# Patient Record
Sex: MALE | Race: WHITE | NOT HISPANIC OR LATINO | URBAN - METROPOLITAN AREA
[De-identification: names, ages, dates, MRNs, and addresses within clinical notes are randomized per-mention and may not be internally consistent; named-entity substitution may affect disease eponyms.]

---

## 2017-05-30 ENCOUNTER — IMPORTED ENCOUNTER (OUTPATIENT)
Dept: URBAN - METROPOLITAN AREA CLINIC 50 | Facility: CLINIC | Age: 67
End: 2017-05-30

## 2017-06-01 ENCOUNTER — IMPORTED ENCOUNTER (OUTPATIENT)
Dept: URBAN - METROPOLITAN AREA CLINIC 50 | Facility: CLINIC | Age: 67
End: 2017-06-01

## 2017-08-01 ENCOUNTER — IMPORTED ENCOUNTER (OUTPATIENT)
Dept: URBAN - METROPOLITAN AREA CLINIC 50 | Facility: CLINIC | Age: 67
End: 2017-08-01

## 2021-04-17 ASSESSMENT — TONOMETRY
OS_IOP_MMHG: 11
OS_IOP_MMHG: 11
OD_IOP_MMHG: 11
OD_IOP_MMHG: 11

## 2021-04-17 ASSESSMENT — VISUAL ACUITY
OS_CC: 20/20-1
OD_CC: 20/25+2
OS_CC: 20/25+
OS_CC: J1+
OD_CC: 20/25+2
OD_CC: J1+

## 2023-04-21 NOTE — PATIENT DISCUSSION
"""Follow dry ARMD without treatment. MVI/AREDS/Geraldine.  Patient "" DISPLAY PLAN FREE TEXT DISPLAY PLAN FREE TEXT

## 2024-07-05 NOTE — PATIENT DISCUSSION
"""Call if vision decreases or RD warning signs increases/RD warnings given.  No signs of retinal tear PAST MEDICAL HISTORY:  2019 novel coronavirus disease (COVID-19) 3/13/2020    Acute UTI 1/2022    Asthma last rescue inhaler use "yesterday"    Asthma     Atrial fibrillation on Eliquis    Carpal tunnel syndrome on both sides     Chronic GERD     Depression     Diabetes mellitus Type II, on metformin    Gastritis     GERD (Gastroesophageal Reflux Disease)     H/O pulmonary hypertension     H/O sleep apnea     H/O tachycardia-bradycardia syndrome     History of 2019 novel coronavirus disease (COVID-19) has prolonged dyspnea and cough improved on prednisone    Hyperlipidemia     Hypertension     Hypothyroidism was prescribed levothyroxine but not taking    Morbid Obesity     OA (osteoarthritis)     Right kidney mass     Right renal mass s/p biopsy 2yrs ago showing fibroma    Varicose veins     Venous stasis syndrome BMI-56    Vitamin D deficiency